# Patient Record
Sex: FEMALE | Race: WHITE
[De-identification: names, ages, dates, MRNs, and addresses within clinical notes are randomized per-mention and may not be internally consistent; named-entity substitution may affect disease eponyms.]

---

## 2017-10-10 ENCOUNTER — APPOINTMENT (OUTPATIENT)
Dept: OBGYN | Facility: CLINIC | Age: 52
End: 2017-10-10
Payer: COMMERCIAL

## 2017-10-10 VITALS
SYSTOLIC BLOOD PRESSURE: 118 MMHG | BODY MASS INDEX: 24.75 KG/M2 | HEIGHT: 64 IN | WEIGHT: 145 LBS | DIASTOLIC BLOOD PRESSURE: 74 MMHG

## 2017-10-10 LAB
BILIRUB UR QL STRIP: NORMAL
COLLECTION METHOD: NORMAL
GLUCOSE UR-MCNC: NORMAL
HCG UR QL: 0.2 EU/DL
HGB UR QL STRIP.AUTO: NORMAL
KETONES UR-MCNC: NORMAL
LEUKOCYTE ESTERASE UR QL STRIP: NORMAL
NITRITE UR QL STRIP: NORMAL
PH UR STRIP: 6.5
PROT UR STRIP-MCNC: NORMAL
SP GR UR STRIP: 1.01

## 2017-10-10 PROCEDURE — 81003 URINALYSIS AUTO W/O SCOPE: CPT | Mod: QW

## 2017-10-10 PROCEDURE — 77085 DXA BONE DENSITY AXL VRT FX: CPT

## 2017-10-10 PROCEDURE — 99396 PREV VISIT EST AGE 40-64: CPT

## 2017-10-17 LAB
CYTOLOGY CVX/VAG DOC THIN PREP: NORMAL
HPV HIGH+LOW RISK DNA PNL CVX: NOT DETECTED

## 2018-10-30 ENCOUNTER — APPOINTMENT (OUTPATIENT)
Dept: OBGYN | Facility: CLINIC | Age: 53
End: 2018-10-30
Payer: COMMERCIAL

## 2018-10-30 VITALS
DIASTOLIC BLOOD PRESSURE: 64 MMHG | WEIGHT: 140 LBS | SYSTOLIC BLOOD PRESSURE: 102 MMHG | BODY MASS INDEX: 23.9 KG/M2 | HEIGHT: 64 IN

## 2018-10-30 DIAGNOSIS — R82.99 OTHER ABNORMAL FINDINGS IN URINE: ICD-10-CM

## 2018-10-30 LAB
BILIRUB UR QL STRIP: NORMAL
CLARITY UR: CLEAR
COLLECTION METHOD: NORMAL
GLUCOSE UR-MCNC: NORMAL
HCG UR QL: 0.2 EU/DL
HEMOCCULT SP1 STL QL: NEGATIVE
HGB UR QL STRIP.AUTO: NORMAL
KETONES UR-MCNC: NORMAL
LEUKOCYTE ESTERASE UR QL STRIP: ABNORMAL
NITRITE UR QL STRIP: NORMAL
PH UR STRIP: 6
PROT UR STRIP-MCNC: NORMAL
SP GR UR STRIP: 1

## 2018-10-30 PROCEDURE — 81003 URINALYSIS AUTO W/O SCOPE: CPT | Mod: QW

## 2018-10-30 PROCEDURE — 99396 PREV VISIT EST AGE 40-64: CPT

## 2018-10-30 PROCEDURE — 82270 OCCULT BLOOD FECES: CPT

## 2018-10-31 LAB
APPEARANCE: CLEAR
BACTERIA: NEGATIVE
BILIRUBIN URINE: NEGATIVE
BLOOD URINE: NEGATIVE
COLOR: YELLOW
GLUCOSE QUALITATIVE U: NEGATIVE MG/DL
KETONES URINE: NEGATIVE
LEUKOCYTE ESTERASE URINE: NEGATIVE
MICROSCOPIC-UA: NORMAL
NITRITE URINE: NEGATIVE
PH URINE: 6.5
PROTEIN URINE: NEGATIVE MG/DL
RED BLOOD CELLS URINE: 1 /HPF
SPECIFIC GRAVITY URINE: 1.01
SQUAMOUS EPITHELIAL CELLS: 0 /HPF
UROBILINOGEN URINE: NEGATIVE MG/DL
WHITE BLOOD CELLS URINE: 1 /HPF

## 2018-11-01 LAB — BACTERIA UR CULT: NORMAL

## 2019-12-17 ENCOUNTER — APPOINTMENT (OUTPATIENT)
Dept: OBGYN | Facility: CLINIC | Age: 54
End: 2019-12-17
Payer: COMMERCIAL

## 2019-12-17 VITALS
WEIGHT: 135 LBS | BODY MASS INDEX: 23.05 KG/M2 | HEIGHT: 64 IN | DIASTOLIC BLOOD PRESSURE: 76 MMHG | SYSTOLIC BLOOD PRESSURE: 110 MMHG

## 2019-12-17 DIAGNOSIS — Z01.419 ENCOUNTER FOR GYNECOLOGICAL EXAMINATION (GENERAL) (ROUTINE) W/OUT ABNORMAL FINDINGS: ICD-10-CM

## 2019-12-17 DIAGNOSIS — N60.01 SOLITARY CYST OF RIGHT BREAST: ICD-10-CM

## 2019-12-17 LAB — HEMOCCULT SP1 STL QL: NEGATIVE

## 2019-12-17 PROCEDURE — 81003 URINALYSIS AUTO W/O SCOPE: CPT | Mod: QW

## 2019-12-17 PROCEDURE — 82270 OCCULT BLOOD FECES: CPT

## 2019-12-17 PROCEDURE — 99396 PREV VISIT EST AGE 40-64: CPT

## 2019-12-17 RX ORDER — AMINO ACIDS
CAPSULE ORAL
Refills: 0 | Status: ACTIVE | COMMUNITY

## 2019-12-17 NOTE — REVIEW OF SYSTEMS
[Pelvic Pain] : pelvic pain [Anxiety] : anxiety [Nl] : Musculoskeletal [Sleep Disturbances] : no sleep disturbances

## 2019-12-17 NOTE — HISTORY OF PRESENT ILLNESS
[1 Year Ago] : 1 year ago [Good] : being in good health [Healthy Diet] : a healthy diet [Regular Exercise] : regular exercise [Last Mammogram ___] : Last Mammogram was [unfilled] [3 or more times/wk] :  3 or more times per week [___ Servings of Dairy/Day] : [unfilled] servings of dairy foods per day [Last Bone Density ___] : Last bone density studies [unfilled] [Last Pap ___] : Last cervical pap smear was [unfilled] [Performed Within 5 Years] : lipid profile performed within the past five years [Performed Last Year] : thyroid function test performed last year [FH Cardiovascular Disease] : family history of cardiovascular disease [Definite ___ (Date)] : the last menstrual period was [unfilled] [Menstrual Problems] : reports abnormal menses [Reproductive Age] : is of reproductive age [Interval Has Decreased] : have been more frequent [Irregular Cycle Intervals] : are  irregular [Frequency: Q ___ days] : occur approximately every [unfilled] days [Bleeding Usually Lasts ___ Days] : usually last [unfilled] days [Vasectomy (partner)] : has a partner with a vasectomy [Pregnancy History] : pregnancy history: [Contraception] : uses contraception [Total Preg ___] : [unfilled] [Full Term ___] : [unfilled] [Monogamous (Male Partner)] : is monogamous with a male partner [HPV Vaccine NA Due to Age] : HPV vaccine not available to patient due to age [Hot Flashes] : hot flashes [Night Sweats] : night sweats [Anxiety] : anxiety [Loss of Libido] : loss of libido [Normal Amount/Duration] : was of a normal amount and duration [Sexually Active] : is sexually active [Experiencing Menopausal Sxs] : experiencing menopausal symptoms [Male ___] : [unfilled] male [Monogamous] : is monogamous [Definite:  ___ (Date)] : the last menstrual period was [unfilled] [Regular Cycle Intervals] : periods have been regular [Last Colonoscopy ___] : Last colonoscopy [unfilled] [Weight Concerns] : no concerns with her weight [Hypertension] : no hypertension [Diabetes] : no diabetes [High LDL] : no high LDL cholesterol [Low HDL] : no low HDL cholesterol [Stress] : no stress [Obesity] : no obesity [Illicit Drug Use] : no illicit drug use [Tobacco Use] : no tobacco use [Sedentary Lifestyle] : no sedentary lifestyle [Abnormal Cervical Cytology] : no abnormal cervical cytology [Previous Breast Cancer] : no previous breast cancer [In Utero DI Exposure] : no diethylstilbestrol (DI) exposure in utero [Previous Colon Polyps] : no previous colon polyps [Inflammatory Bowel Disease] : no inflammatory bowel disease [de-identified] : RAN "TOWER TO TUNNELS" 5K THIS YEAR, RUNS ALMOST EVERY DAY [Dyspareunia] : no dyspareunia [FreeTextEntry8] : HOT FLASHES WITH SPOTTING ONLY, RELIEF OF VAGINAL DRYNESS [FreeTextEntry9] : LIBIDO HAS "ALWAYS BEEN LOW" [Currently In Menopause] : not currently in menopause [Spotting Between  Menses] : no spotting between menses

## 2019-12-17 NOTE — PHYSICAL EXAM
[Awake] : awake [Alert] : alert [Soft] : soft [Oriented x3] : oriented to person, place, and time [Normal] : uterus [Atrophy] : atrophy [No Bleeding] : there was no active vaginal bleeding [Uterine Adnexae] : were not tender and not enlarged [Nl Sphincter Tone] : normal sphincter tone [External Hemorrhoid] : an external hemorrhoid [RRR, No Murmurs] : RRR, no murmurs [CTAB] : CTAB [Acute Distress] : no acute distress [LAD] : no lymphadenopathy [Thyroid Nodule] : no thyroid nodule [Goiter] : no goiter [Mass] : no breast mass [Nipple Discharge] : no nipple discharge [Tender] : non tender [Axillary LAD] : no axillary lymphadenopathy [Occult Blood] : occult blood test from digital rectal exam was negative

## 2019-12-18 LAB
BILIRUB UR QL STRIP: NORMAL
COLLECTION METHOD: NORMAL
GLUCOSE UR-MCNC: NORMAL
HCG UR QL: 0.2 EU/DL
HGB UR QL STRIP.AUTO: NORMAL
KETONES UR-MCNC: NORMAL
LEUKOCYTE ESTERASE UR QL STRIP: NORMAL
NITRITE UR QL STRIP: NORMAL
PH UR STRIP: 7
PROT UR STRIP-MCNC: NORMAL
SP GR UR STRIP: 1.01

## 2021-03-08 ENCOUNTER — APPOINTMENT (OUTPATIENT)
Dept: OBGYN | Facility: CLINIC | Age: 56
End: 2021-03-08
Payer: COMMERCIAL

## 2021-03-08 VITALS
TEMPERATURE: 97.3 F | HEIGHT: 64 IN | DIASTOLIC BLOOD PRESSURE: 70 MMHG | SYSTOLIC BLOOD PRESSURE: 112 MMHG | BODY MASS INDEX: 25.61 KG/M2 | WEIGHT: 150 LBS

## 2021-03-08 DIAGNOSIS — Z01.419 ENCOUNTER FOR GYNECOLOGICAL EXAMINATION (GENERAL) (ROUTINE) W/OUT ABNORMAL FINDINGS: ICD-10-CM

## 2021-03-08 LAB
HCG UR QL: NEGATIVE
HEMOCCULT SP1 STL QL: NEGATIVE
QUALITY CONTROL: YES

## 2021-03-08 PROCEDURE — 99072 ADDL SUPL MATRL&STAF TM PHE: CPT

## 2021-03-08 PROCEDURE — 82270 OCCULT BLOOD FECES: CPT

## 2021-03-08 PROCEDURE — 81025 URINE PREGNANCY TEST: CPT

## 2021-03-08 PROCEDURE — 99396 PREV VISIT EST AGE 40-64: CPT

## 2021-03-08 RX ORDER — EPINEPHRINE 0.3 MG/.3ML
0.3 INJECTION INTRAMUSCULAR
Qty: 2 | Refills: 0 | Status: ACTIVE | COMMUNITY
Start: 2021-01-04

## 2021-03-08 NOTE — HISTORY OF PRESENT ILLNESS
[Patient reported mammogram was normal] : Patient reported mammogram was normal [Patient reported PAP Smear was normal] : Patient reported PAP Smear was normal [Patient reported bone density results were normal] : Patient reported bone density results were normal [Patient reported colonoscopy was normal] : Patient reported colonoscopy was normal [Y] : Patient uses contraception [Vasectomy (partner)] : has a partner with a vasectomy [TextBox_4] : 56 YO FEMALE,HERE FOR ANNUAL EXAM  Pt has been well this past year\par HER LAST ANNUAL EXAM WAS:12/17/19\par \par PREGNANCY HISTORY:  TOTAL   2   LIVE BIRTHS:   2   SAB:      IAB:\par \par SEXUALLY ACTIVE: yes\par LENGTH OF TIME IN RELATIONSHIP:30 + years\par \par MEDICAL HISTORY INCLUDES: My Risk neg, irritable bladder,\par FAMILY HISTORY IS SIGNIFICANT FOR:DM melanoma, breast cancer\par \par LAST VISIT WITH PRIMARY DOCTOR:Feb 2021\par LAST BLOOD WORK:Feb 2021\par \par NUTRITIONAL INFO: healthy eater, about 7 cups of greens a day and ounce of cheese\par CALCIUM INTAKE:SUPPLEMENTS:\par CORRECT USE OF CALCIUM SUPPLEMENTS WAS REVIEWED\par \par EXERCISES: most days\par  [Mammogramdate] : 3/5/21 stated [TextBox_19] : dense [PapSmeardate] : 10/17/17 [BoneDensityDate] : 10/10/17 [ColonoscopyDate] : age 52 [TextBox_102] : irregular, average q 2-3 months   THIS PAST MENSES WAS 2 DAYS AND VERY LIGHT. Pt does get menstrual molimina [LMPDate] : 2/15/21 [MensesFreq] : q2-3 months [MensesLength] : usually 7-8 days [MensesAmount] : day 2 heaviest: change q2h

## 2021-03-11 LAB
CYTOLOGY CVX/VAG DOC THIN PREP: NORMAL
HPV HIGH+LOW RISK DNA PNL CVX: NOT DETECTED

## 2021-03-29 ENCOUNTER — NON-APPOINTMENT (OUTPATIENT)
Age: 56
End: 2021-03-29

## 2022-03-21 ENCOUNTER — APPOINTMENT (OUTPATIENT)
Dept: OBGYN | Facility: CLINIC | Age: 57
End: 2022-03-21
Payer: COMMERCIAL

## 2022-03-21 VITALS
WEIGHT: 155 LBS | SYSTOLIC BLOOD PRESSURE: 110 MMHG | HEIGHT: 64 IN | BODY MASS INDEX: 26.46 KG/M2 | DIASTOLIC BLOOD PRESSURE: 68 MMHG

## 2022-03-21 DIAGNOSIS — Z12.11 ENCOUNTER FOR SCREENING FOR MALIGNANT NEOPLASM OF COLON: ICD-10-CM

## 2022-03-21 DIAGNOSIS — R92.2 INCONCLUSIVE MAMMOGRAM: ICD-10-CM

## 2022-03-21 DIAGNOSIS — N32.89 OTHER SPECIFIED DISORDERS OF BLADDER: ICD-10-CM

## 2022-03-21 DIAGNOSIS — R10.2 PELVIC AND PERINEAL PAIN: ICD-10-CM

## 2022-03-21 DIAGNOSIS — Z87.42 PERSONAL HISTORY OF OTHER DISEASES OF THE FEMALE GENITAL TRACT: ICD-10-CM

## 2022-03-21 LAB
DATE COLLECTED: NORMAL
HEMOCCULT SP1 STL QL: NEGATIVE
QUALITY CONTROL: YES

## 2022-03-21 PROCEDURE — 99396 PREV VISIT EST AGE 40-64: CPT

## 2022-03-21 PROCEDURE — 82270 OCCULT BLOOD FECES: CPT

## 2022-03-21 NOTE — PHYSICAL EXAM

## 2022-03-21 NOTE — HISTORY OF PRESENT ILLNESS
[FreeTextEntry1] : 58 YO FEMALE   X 2\par HERE FOR ANNUAL EXAM\par HER LAST ANNUAL EXAM WAS \par LMP 2022  \par SEXUALLY ACTIVE:yes male\par LENGTH OF TIME IN RELATIONSHIP:  exclusive \par MEDICAL HISTORY INCLUDES: My Risk neg, irritable bladder,\par FAMILY HISTORY IS SIGNIFICANT FOR: FATHER , MOTHER BREAST AT 72 YRS\par LAST VISIT WITH PRIMARY DOCTOR:  \par NUTRITIONAL INFO: overall well balanced,ca rich food: 2 daily, WEIGHT BEARING Exercise  [Mammogramdate] : 2021 [BreastSonogramDate] : 2021 [PapSmeardate] : 2021 [ColonoscopyDate] : 5/2022 [TextBox_43] : SANDER

## 2022-03-21 NOTE — DISCUSSION/SUMMARY
[FreeTextEntry1] : 56 YO FEMALE,HERE FOR ANNUAL EXAM\par Rectal Exam: no rectal tenderness and occult blood test from digital rectal exam was negative. \par STOOL GUAIAC\par LOT 1202, EXP 10/30/23, DEV. LOT [id number], EXP 01/2025\par - STD DECLINE\par FLU VACCINE DECLINE\par ALL QUESTIONS ANSWERED\par DISCUSSED PRECAUTIONS AGAINST COVID19, INCLUDING MASK WEARING, SOCIAL DISTANCING AND HAND WASHING.\par VACCINATED X 3 (MODERNA)

## 2022-03-23 LAB
C TRACH RRNA SPEC QL NAA+PROBE: NOT DETECTED
HPV HIGH+LOW RISK DNA PNL CVX: NOT DETECTED
N GONORRHOEA RRNA SPEC QL NAA+PROBE: NOT DETECTED
SOURCE TP AMPLIFICATION: NORMAL

## 2022-03-28 LAB — CYTOLOGY CVX/VAG DOC THIN PREP: NORMAL

## 2022-04-18 ENCOUNTER — NON-APPOINTMENT (OUTPATIENT)
Age: 57
End: 2022-04-18

## 2022-05-03 DIAGNOSIS — R92.8 OTHER ABNORMAL AND INCONCLUSIVE FINDINGS ON DIAGNOSTIC IMAGING OF BREAST: ICD-10-CM

## 2022-05-19 ENCOUNTER — NON-APPOINTMENT (OUTPATIENT)
Age: 57
End: 2022-05-19